# Patient Record
Sex: FEMALE | Race: BLACK OR AFRICAN AMERICAN | NOT HISPANIC OR LATINO | URBAN - METROPOLITAN AREA
[De-identification: names, ages, dates, MRNs, and addresses within clinical notes are randomized per-mention and may not be internally consistent; named-entity substitution may affect disease eponyms.]

---

## 2024-11-14 ENCOUNTER — EMERGENCY (EMERGENCY)
Facility: HOSPITAL | Age: 19
LOS: 0 days | Discharge: HOME | End: 2024-11-14
Attending: STUDENT IN AN ORGANIZED HEALTH CARE EDUCATION/TRAINING PROGRAM
Payer: COMMERCIAL

## 2024-11-14 VITALS
OXYGEN SATURATION: 98 % | SYSTOLIC BLOOD PRESSURE: 135 MMHG | HEIGHT: 64 IN | TEMPERATURE: 98 F | WEIGHT: 259.93 LBS | HEART RATE: 116 BPM | DIASTOLIC BLOOD PRESSURE: 74 MMHG | RESPIRATION RATE: 18 BRPM

## 2024-11-14 VITALS
HEART RATE: 83 BPM | TEMPERATURE: 99 F | RESPIRATION RATE: 20 BRPM | SYSTOLIC BLOOD PRESSURE: 144 MMHG | OXYGEN SATURATION: 97 % | DIASTOLIC BLOOD PRESSURE: 88 MMHG

## 2024-11-14 DIAGNOSIS — M79.662 PAIN IN LEFT LOWER LEG: ICD-10-CM
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
## 2024-11-14 DIAGNOSIS — R58 HEMORRHAGE, NOT ELSEWHERE CLASSIFIED: ICD-10-CM

## 2024-11-14 DIAGNOSIS — Z91.013 ALLERGY TO SEAFOOD: ICD-10-CM

## 2024-11-14 PROCEDURE — 99284 EMERGENCY DEPT VISIT MOD MDM: CPT

## 2024-11-14 PROCEDURE — 73590 X-RAY EXAM OF LOWER LEG: CPT | Mod: 26,LT

## 2024-11-14 PROCEDURE — 93971 EXTREMITY STUDY: CPT | Mod: 26,LT

## 2024-11-14 RX ORDER — ACETAMINOPHEN 500 MG
975 TABLET ORAL ONCE
Refills: 0 | Status: COMPLETED | OUTPATIENT
Start: 2024-11-14 | End: 2024-11-14

## 2024-11-14 RX ADMIN — Medication 975 MILLIGRAM(S): at 20:02

## 2024-11-14 NOTE — ED ADULT NURSE NOTE - OBJECTIVE STATEMENT
pt is AOx3, ambulatory with steady gait. pt presents to the ED tonight with complaints of pain/swelling/and bruising to the L calf for 2-3 weeks. pt states she was playing around with her friend and states she stepped on her calf. pt is able to ambulate without difficulties - just reports some pain. pt rates her pain a 7/10, denies taking medication PTA in the ED. denies blood thinner use. pt denies any CP, SOB, fever, chills, N/V/D at this time. pt LMP 11/2/24  denies PMH

## 2024-11-14 NOTE — ED ADULT NURSE NOTE - NS ED NOTE ABUSE SUSPICION NEGLECT YN
"Spine Therapy Program Four Week Report      Lumbar Oswestry Score: 28.89%  Overall Improvement (Subjective): 50%  Pain Score: 4/10    Yohana reports for her four week follow up of her spine therapy program.  She reports good improvement and enjoys her therapists.  She states she was having marked improvement at around 2 weeks ago and then felt a \"pop\" while [place] beach and twisting to the left.  Yohana points to the left SI joint as the area this occurred.  Her pain has been slightly worse since then.      Plan: proceed with STP with treatment focus on left hamstring and glutes, right SI joint, right groin mm, and L5/S1 disc.     Patient does not want to proceed with injection therapy at this time.  F/u in 4 weeks.        Yandel Molina DC, GERTRUDIS  Director - Occupational Medicine Department  [facility] Board Certified  [address] [address]  Phone [phone]  Fax [phone]    3:06 PM 6/29/2021    "
No

## 2024-11-14 NOTE — ED PROVIDER NOTE - PHYSICAL EXAMINATION
VITAL SIGNS: I have reviewed nursing notes and confirm.  CONSTITUTIONAL: well-appearing, non-toxic, NAD  SKIN: Warm dry, normal skin turgor  CARD: RRR, no murmurs, rubs or gallops  RESP: clear to ausculation b/l.  No rales, rhonchi, or wheezing.  EXT: Full ROM, no bony tenderness, no pedal edema, right medial calf tenderness to palpation and bruising from injury.   PSYCH: Cooperative, appropriate.

## 2024-11-14 NOTE — ED PROVIDER NOTE - PATIENT PORTAL LINK FT
You can access the FollowMyHealth Patient Portal offered by [facility] by registering at the following website: [email or web address]. By joining [employer]’s FollowMyHealth portal, you will also be able to view your health information using other applications (apps) compatible with our system.

## 2024-11-14 NOTE — ED PROVIDER NOTE - CLINICAL SUMMARY MEDICAL DECISION MAKING FREE TEXT BOX
Patient with left calf ecchymosis and continued pain, able to ambulate, compartments are soft, patient is neurovascularly intact, patient reports pain is minimal, with continued swelling, obtained ultrasound which was negative for DVT, x-ray without noted acute fractures.  Advise follow-up with primary care physician return precautions discussed, NSAIDs ice

## 2024-11-14 NOTE — ED PROVIDER NOTE - OBJECTIVE STATEMENT
Pt. is a 19-year-old female with no significant PMH presents with c/o left medial calf tenderness 6/10 worsened with weight-bearing and flexion/extension to 8/10 sharp pain non-radiating from stated " injury of friend stepping on her calf while playing". Pt. denies sob, chest pain, dyspnea, oral contraceptive use, hx smoking, prolonged sitting, fever, nausea, lightheadedness, dizziness.

## 2024-11-14 NOTE — ED ADULT TRIAGE NOTE - CHIEF COMPLAINT QUOTE
Patient reports Another person "stepped on my calf 2 weeks ago and its swollen and painful." Pain 7/10. Able to ambulate. Appears in no acute distress. LMP:  11/2/24  PMH: denies

## 2024-11-15 PROBLEM — Z78.9 OTHER SPECIFIED HEALTH STATUS: Chronic | Status: ACTIVE | Noted: 2024-02-23
